# Patient Record
Sex: FEMALE | Race: OTHER | HISPANIC OR LATINO | URBAN - METROPOLITAN AREA
[De-identification: names, ages, dates, MRNs, and addresses within clinical notes are randomized per-mention and may not be internally consistent; named-entity substitution may affect disease eponyms.]

---

## 2020-01-11 ENCOUNTER — INPATIENT (INPATIENT)
Facility: HOSPITAL | Age: 85
LOS: 0 days | Discharge: HOME | DRG: 389 | End: 2020-01-12
Attending: INTERNAL MEDICINE | Admitting: INTERNAL MEDICINE
Payer: COMMERCIAL

## 2020-01-11 VITALS
RESPIRATION RATE: 18 BRPM | SYSTOLIC BLOOD PRESSURE: 196 MMHG | DIASTOLIC BLOOD PRESSURE: 75 MMHG | TEMPERATURE: 97 F | OXYGEN SATURATION: 96 % | HEART RATE: 91 BPM

## 2020-01-11 DIAGNOSIS — K56.609 UNSPECIFIED INTESTINAL OBSTRUCTION, UNSPECIFIED AS TO PARTIAL VERSUS COMPLETE OBSTRUCTION: ICD-10-CM

## 2020-01-11 DIAGNOSIS — Z90.49 ACQUIRED ABSENCE OF OTHER SPECIFIED PARTS OF DIGESTIVE TRACT: Chronic | ICD-10-CM

## 2020-01-11 LAB
ALBUMIN SERPL ELPH-MCNC: 3.7 G/DL — SIGNIFICANT CHANGE UP (ref 3.3–5.2)
ALP SERPL-CCNC: 440 U/L — HIGH (ref 40–120)
ALT FLD-CCNC: 78 U/L — HIGH
ANION GAP SERPL CALC-SCNC: 13 MMOL/L — SIGNIFICANT CHANGE UP (ref 5–17)
APPEARANCE UR: ABNORMAL
APTT BLD: 32.7 SEC — SIGNIFICANT CHANGE UP (ref 27.5–36.3)
AST SERPL-CCNC: 172 U/L — HIGH
BACTERIA # UR AUTO: ABNORMAL
BASOPHILS # BLD AUTO: 0.04 K/UL — SIGNIFICANT CHANGE UP (ref 0–0.2)
BASOPHILS NFR BLD AUTO: 0.3 % — SIGNIFICANT CHANGE UP (ref 0–2)
BILIRUB SERPL-MCNC: 0.2 MG/DL — LOW (ref 0.4–2)
BILIRUB UR-MCNC: NEGATIVE — SIGNIFICANT CHANGE UP
BUN SERPL-MCNC: 22 MG/DL — HIGH (ref 8–20)
CALCIUM SERPL-MCNC: 9.2 MG/DL — SIGNIFICANT CHANGE UP (ref 8.6–10.2)
CHLORIDE SERPL-SCNC: 107 MMOL/L — SIGNIFICANT CHANGE UP (ref 98–107)
CO2 SERPL-SCNC: 18 MMOL/L — LOW (ref 22–29)
COLOR SPEC: YELLOW — SIGNIFICANT CHANGE UP
COMMENT - URINE: SIGNIFICANT CHANGE UP
CREAT SERPL-MCNC: 0.75 MG/DL — SIGNIFICANT CHANGE UP (ref 0.5–1.3)
DIFF PNL FLD: ABNORMAL
EOSINOPHIL # BLD AUTO: 0.24 K/UL — SIGNIFICANT CHANGE UP (ref 0–0.5)
EOSINOPHIL NFR BLD AUTO: 1.8 % — SIGNIFICANT CHANGE UP (ref 0–6)
EPI CELLS # UR: SIGNIFICANT CHANGE UP
GLUCOSE SERPL-MCNC: 159 MG/DL — HIGH (ref 70–115)
GLUCOSE UR QL: NEGATIVE MG/DL — SIGNIFICANT CHANGE UP
HCT VFR BLD CALC: 35.4 % — SIGNIFICANT CHANGE UP (ref 34.5–45)
HGB BLD-MCNC: 10.7 G/DL — LOW (ref 11.5–15.5)
IMM GRANULOCYTES NFR BLD AUTO: 0.7 % — SIGNIFICANT CHANGE UP (ref 0–1.5)
INR BLD: 1.04 RATIO — SIGNIFICANT CHANGE UP (ref 0.88–1.16)
KETONES UR-MCNC: NEGATIVE — SIGNIFICANT CHANGE UP
LACTATE BLDV-MCNC: 1.6 MMOL/L — SIGNIFICANT CHANGE UP (ref 0.5–2)
LEUKOCYTE ESTERASE UR-ACNC: ABNORMAL
LIDOCAIN IGE QN: 101 U/L — HIGH (ref 22–51)
LYMPHOCYTES # BLD AUTO: 1.13 K/UL — SIGNIFICANT CHANGE UP (ref 1–3.3)
LYMPHOCYTES # BLD AUTO: 8.3 % — LOW (ref 13–44)
MCHC RBC-ENTMCNC: 30.1 PG — SIGNIFICANT CHANGE UP (ref 27–34)
MCHC RBC-ENTMCNC: 30.2 GM/DL — LOW (ref 32–36)
MCV RBC AUTO: 99.7 FL — SIGNIFICANT CHANGE UP (ref 80–100)
MONOCYTES # BLD AUTO: 0.7 K/UL — SIGNIFICANT CHANGE UP (ref 0–0.9)
MONOCYTES NFR BLD AUTO: 5.1 % — SIGNIFICANT CHANGE UP (ref 2–14)
NEUTROPHILS # BLD AUTO: 11.46 K/UL — HIGH (ref 1.8–7.4)
NEUTROPHILS NFR BLD AUTO: 83.8 % — HIGH (ref 43–77)
NITRITE UR-MCNC: NEGATIVE — SIGNIFICANT CHANGE UP
PH UR: 8 — SIGNIFICANT CHANGE UP (ref 5–8)
PLATELET # BLD AUTO: 367 K/UL — SIGNIFICANT CHANGE UP (ref 150–400)
POTASSIUM SERPL-MCNC: 4.2 MMOL/L — SIGNIFICANT CHANGE UP (ref 3.5–5.3)
POTASSIUM SERPL-SCNC: 4.2 MMOL/L — SIGNIFICANT CHANGE UP (ref 3.5–5.3)
PROT SERPL-MCNC: 8.4 G/DL — SIGNIFICANT CHANGE UP (ref 6.6–8.7)
PROT UR-MCNC: 100 MG/DL
PROTHROM AB SERPL-ACNC: 12 SEC — SIGNIFICANT CHANGE UP (ref 10–12.9)
RBC # BLD: 3.55 M/UL — LOW (ref 3.8–5.2)
RBC # FLD: 14 % — SIGNIFICANT CHANGE UP (ref 10.3–14.5)
RBC CASTS # UR COMP ASSIST: ABNORMAL /HPF (ref 0–4)
SODIUM SERPL-SCNC: 138 MMOL/L — SIGNIFICANT CHANGE UP (ref 135–145)
SP GR SPEC: 1.01 — SIGNIFICANT CHANGE UP (ref 1.01–1.02)
UROBILINOGEN FLD QL: NEGATIVE MG/DL — SIGNIFICANT CHANGE UP
WBC # BLD: 13.66 K/UL — HIGH (ref 3.8–10.5)
WBC # FLD AUTO: 13.66 K/UL — HIGH (ref 3.8–10.5)
WBC UR QL: ABNORMAL

## 2020-01-11 PROCEDURE — 99285 EMERGENCY DEPT VISIT HI MDM: CPT

## 2020-01-11 PROCEDURE — 99223 1ST HOSP IP/OBS HIGH 75: CPT

## 2020-01-11 PROCEDURE — 99203 OFFICE O/P NEW LOW 30 MIN: CPT

## 2020-01-11 PROCEDURE — 74177 CT ABD & PELVIS W/CONTRAST: CPT | Mod: 26

## 2020-01-11 RX ORDER — TRAMADOL HYDROCHLORIDE 50 MG/1
1 TABLET ORAL
Qty: 0 | Refills: 0 | DISCHARGE

## 2020-01-11 RX ORDER — CARVEDILOL PHOSPHATE 80 MG/1
2 CAPSULE, EXTENDED RELEASE ORAL
Qty: 0 | Refills: 0 | DISCHARGE

## 2020-01-11 RX ORDER — TAMSULOSIN HYDROCHLORIDE 0.4 MG/1
1 CAPSULE ORAL
Qty: 0 | Refills: 0 | DISCHARGE

## 2020-01-11 RX ORDER — LABETALOL HCL 100 MG
10 TABLET ORAL EVERY 4 HOURS
Refills: 0 | Status: DISCONTINUED | OUTPATIENT
Start: 2020-01-11 | End: 2020-01-11

## 2020-01-11 RX ORDER — SODIUM CHLORIDE 9 MG/ML
1000 INJECTION INTRAMUSCULAR; INTRAVENOUS; SUBCUTANEOUS ONCE
Refills: 0 | Status: COMPLETED | OUTPATIENT
Start: 2020-01-11 | End: 2020-01-11

## 2020-01-11 RX ORDER — OMEPRAZOLE 10 MG/1
1 CAPSULE, DELAYED RELEASE ORAL
Qty: 0 | Refills: 0 | DISCHARGE

## 2020-01-11 RX ORDER — FAMOTIDINE 10 MG/ML
20 INJECTION INTRAVENOUS ONCE
Refills: 0 | Status: COMPLETED | OUTPATIENT
Start: 2020-01-11 | End: 2020-01-11

## 2020-01-11 RX ORDER — NIFEDIPINE 30 MG
0.5 TABLET, EXTENDED RELEASE 24 HR ORAL
Qty: 0 | Refills: 0 | DISCHARGE

## 2020-01-11 RX ORDER — CEFTRIAXONE 500 MG/1
1000 INJECTION, POWDER, FOR SOLUTION INTRAMUSCULAR; INTRAVENOUS ONCE
Refills: 0 | Status: COMPLETED | OUTPATIENT
Start: 2020-01-11 | End: 2020-01-11

## 2020-01-11 RX ORDER — NIFEDIPINE 30 MG
15 TABLET, EXTENDED RELEASE 24 HR ORAL ONCE
Refills: 0 | Status: DISCONTINUED | OUTPATIENT
Start: 2020-01-11 | End: 2020-01-11

## 2020-01-11 RX ORDER — LANOLIN ALCOHOL/MO/W.PET/CERES
1 CREAM (GRAM) TOPICAL
Qty: 0 | Refills: 0 | DISCHARGE

## 2020-01-11 RX ORDER — HYDRALAZINE HCL 50 MG
10 TABLET ORAL EVERY 4 HOURS
Refills: 0 | Status: DISCONTINUED | OUTPATIENT
Start: 2020-01-11 | End: 2020-01-11

## 2020-01-11 RX ORDER — MORPHINE SULFATE 50 MG/1
2 CAPSULE, EXTENDED RELEASE ORAL ONCE
Refills: 0 | Status: DISCONTINUED | OUTPATIENT
Start: 2020-01-11 | End: 2020-01-11

## 2020-01-11 RX ORDER — ASPIRIN/CALCIUM CARB/MAGNESIUM 324 MG
1 TABLET ORAL
Qty: 0 | Refills: 0 | DISCHARGE

## 2020-01-11 RX ORDER — HYDRALAZINE HCL 50 MG
10 TABLET ORAL EVERY 4 HOURS
Refills: 0 | Status: DISCONTINUED | OUTPATIENT
Start: 2020-01-11 | End: 2020-01-12

## 2020-01-11 RX ORDER — SODIUM CHLORIDE 9 MG/ML
1000 INJECTION, SOLUTION INTRAVENOUS
Refills: 0 | Status: DISCONTINUED | OUTPATIENT
Start: 2020-01-11 | End: 2020-01-12

## 2020-01-11 RX ORDER — KETOROLAC TROMETHAMINE 30 MG/ML
15 SYRINGE (ML) INJECTION EVERY 6 HOURS
Refills: 0 | Status: DISCONTINUED | OUTPATIENT
Start: 2020-01-11 | End: 2020-01-12

## 2020-01-11 RX ORDER — MIRTAZAPINE 45 MG/1
1 TABLET, ORALLY DISINTEGRATING ORAL
Qty: 0 | Refills: 0 | DISCHARGE

## 2020-01-11 RX ORDER — AMLODIPINE BESYLATE 2.5 MG/1
5 TABLET ORAL ONCE
Refills: 0 | Status: COMPLETED | OUTPATIENT
Start: 2020-01-11 | End: 2020-01-11

## 2020-01-11 RX ORDER — HYDRALAZINE HCL 50 MG
10 TABLET ORAL ONCE
Refills: 0 | Status: COMPLETED | OUTPATIENT
Start: 2020-01-11 | End: 2020-01-11

## 2020-01-11 RX ORDER — CEFTRIAXONE 500 MG/1
1000 INJECTION, POWDER, FOR SOLUTION INTRAMUSCULAR; INTRAVENOUS EVERY 24 HOURS
Refills: 0 | Status: DISCONTINUED | OUTPATIENT
Start: 2020-01-11 | End: 2020-01-12

## 2020-01-11 RX ORDER — HALOPERIDOL DECANOATE 100 MG/ML
1 INJECTION INTRAMUSCULAR EVERY 6 HOURS
Refills: 0 | Status: DISCONTINUED | OUTPATIENT
Start: 2020-01-11 | End: 2020-01-12

## 2020-01-11 RX ORDER — PANTOPRAZOLE SODIUM 20 MG/1
40 TABLET, DELAYED RELEASE ORAL
Refills: 0 | Status: DISCONTINUED | OUTPATIENT
Start: 2020-01-11 | End: 2020-01-12

## 2020-01-11 RX ORDER — CARVEDILOL PHOSPHATE 80 MG/1
1 CAPSULE, EXTENDED RELEASE ORAL
Qty: 0 | Refills: 0 | DISCHARGE

## 2020-01-11 RX ORDER — ONDANSETRON 8 MG/1
4 TABLET, FILM COATED ORAL EVERY 4 HOURS
Refills: 0 | Status: DISCONTINUED | OUTPATIENT
Start: 2020-01-11 | End: 2020-01-12

## 2020-01-11 RX ORDER — ONDANSETRON 8 MG/1
4 TABLET, FILM COATED ORAL ONCE
Refills: 0 | Status: COMPLETED | OUTPATIENT
Start: 2020-01-11 | End: 2020-01-11

## 2020-01-11 RX ORDER — ACETAMINOPHEN 500 MG
650 TABLET ORAL EVERY 6 HOURS
Refills: 0 | Status: DISCONTINUED | OUTPATIENT
Start: 2020-01-11 | End: 2020-01-12

## 2020-01-11 RX ADMIN — ONDANSETRON 4 MILLIGRAM(S): 8 TABLET, FILM COATED ORAL at 10:30

## 2020-01-11 RX ADMIN — Medication 10 MILLIGRAM(S): at 20:19

## 2020-01-11 RX ADMIN — SODIUM CHLORIDE 1000 MILLILITER(S): 9 INJECTION INTRAMUSCULAR; INTRAVENOUS; SUBCUTANEOUS at 11:28

## 2020-01-11 RX ADMIN — CEFTRIAXONE 1000 MILLIGRAM(S): 500 INJECTION, POWDER, FOR SOLUTION INTRAMUSCULAR; INTRAVENOUS at 15:49

## 2020-01-11 RX ADMIN — Medication 15 MILLIGRAM(S): at 18:31

## 2020-01-11 RX ADMIN — CEFTRIAXONE 100 MILLIGRAM(S): 500 INJECTION, POWDER, FOR SOLUTION INTRAMUSCULAR; INTRAVENOUS at 15:19

## 2020-01-11 RX ADMIN — AMLODIPINE BESYLATE 5 MILLIGRAM(S): 2.5 TABLET ORAL at 15:19

## 2020-01-11 RX ADMIN — MORPHINE SULFATE 2 MILLIGRAM(S): 50 CAPSULE, EXTENDED RELEASE ORAL at 11:24

## 2020-01-11 RX ADMIN — PANTOPRAZOLE SODIUM 40 MILLIGRAM(S): 20 TABLET, DELAYED RELEASE ORAL at 16:50

## 2020-01-11 RX ADMIN — SODIUM CHLORIDE 1000 MILLILITER(S): 9 INJECTION INTRAMUSCULAR; INTRAVENOUS; SUBCUTANEOUS at 10:28

## 2020-01-11 RX ADMIN — MORPHINE SULFATE 2 MILLIGRAM(S): 50 CAPSULE, EXTENDED RELEASE ORAL at 16:24

## 2020-01-11 RX ADMIN — MORPHINE SULFATE 2 MILLIGRAM(S): 50 CAPSULE, EXTENDED RELEASE ORAL at 16:34

## 2020-01-11 RX ADMIN — Medication 10 MILLIGRAM(S): at 18:24

## 2020-01-11 RX ADMIN — MORPHINE SULFATE 2 MILLIGRAM(S): 50 CAPSULE, EXTENDED RELEASE ORAL at 10:29

## 2020-01-11 RX ADMIN — FAMOTIDINE 20 MILLIGRAM(S): 10 INJECTION INTRAVENOUS at 10:29

## 2020-01-11 RX ADMIN — SODIUM CHLORIDE 83 MILLILITER(S): 9 INJECTION, SOLUTION INTRAVENOUS at 16:48

## 2020-01-11 RX ADMIN — Medication 15 MILLIGRAM(S): at 18:47

## 2020-01-11 RX ADMIN — Medication 10 MILLIGRAM(S): at 23:39

## 2020-01-11 NOTE — CONSULT NOTE ADULT - ATTENDING COMMENTS
89yo female PMH dementia, HTN, gastritis, colon cancer s/p colectomy, , and multiple events of SBO presenting with  LLQ pain a/w diarrhea (nonbloody) and belching. Patient woke up this morning and developed severe LUQ pain. Took BP meds this morning (nifedipine) but then immediately vomited. No fever, chills, chest pain, and sbo. No black/bloody stools. Patient's son was at bedside and he takes care of her. ED work up had CT scan indicative of SBO. Surgery consulted for eval.     AAOx1, NAD    AVSS    PUL: CTA  CV:RRR  GI: mild distension but soft and NT, Midline incisions are healed    Plan:  1. Doubt SBO due to flatus and BM. I would proceed with gastrograffen swallow and if it passes thru to her collon than she can be d/c to home  2. Treaqt for UTI and HTN      code 61857

## 2020-01-11 NOTE — ED ADULT NURSE NOTE - OBJECTIVE STATEMENT
Assumed care at 1010 pt co LLQ pain that started this morning with N/V. as per son pt woke up asked for something to eat after breakfast started co of severe pain to abdomen. pt has hx of dementia, AOX2. Son states she vomited after taking her BP medications.

## 2020-01-11 NOTE — ED ADULT NURSE REASSESSMENT NOTE - NS ED NURSE REASSESS COMMENT FT1
MD Morales Surgery team at pts bedside, plan of care explained to pt and family both verbalized understanding.
Medicine PA carli made aware of manual BPs of b/l arms. order for hydralazine placed by PA. will administer as ordered. as per PA, pt is NPO for sx. son arrived back at bedside, updated on plan of care and approves. will continue to reassess.
pt ambulated to the bathroom with 2 person assist, pt denies any pain at the moment, MD Sinha aware of pts elevated BP. pt has clonidine patch to left upper back.
pt cleaned and changed by SNA. assisted to bathroom by SNA and son, pt tolerated well. steady gait noted with 2 assist.
spoke to medicine ARJUN Nieto regarding elevated BP. instructed to take manual BP and call medicine PA back.
received report from BARB Iglesias. received pt in room lying comfortably on stretcher, son at bedside. pt pleasantly confused but as per son, this is pt's baseline mental status for this time of night. pt denies pain at this time. pt in no acute distress, denies sob, chest pain. maintaining airway on room air. pt and son aware of plan of care, sx arrived at bedside to update son on xray to be done at 1am tonight, son agrees. son and pt offer no questions or complaints at this time. BP continues to be elevated. attempted to reach MD Trace MD no longer here. attempted to reach Medicine PA, left message. son left bedside to get food for self. pt given call bell and instructed on use, was able to demonstrate use back to RN. fall precautions remain in place. will continue to reassess.

## 2020-01-11 NOTE — ED PROVIDER NOTE - CLINICAL SUMMARY MEDICAL DECISION MAKING FREE TEXT BOX
87yo female with PMH dementia, HTN, gastritis p/w severe LUQ pain, ddx includes gastritis vs colitis vs renal colic, will check labs, CT a/p, UA, give Gi cocktail, morphine, zofran, and reassess. Elvira Mccain DO

## 2020-01-11 NOTE — ED ADULT TRIAGE NOTE - CHIEF COMPLAINT QUOTE
Patient BIBA, worsening left sided abdominal pain, son states patient was vomiting this morning and passing gas

## 2020-01-11 NOTE — ED ADULT NURSE NOTE - NSIMPLEMENTINTERV_GEN_ALL_ED
Implemented All Fall with Harm Risk Interventions:  [place] to call system. Call bell, personal items and telephone within reach. Instruct patient to call for assistance. Room bathroom lighting operational. Non-slip footwear when patient is off stretcher. Physically safe environment: no spills, clutter or unnecessary equipment. Stretcher in lowest position, wheels locked, appropriate side rails in place. Provide visual cue, wrist band, yellow gown, etc. Monitor gait and stability. Monitor for mental status changes and reorient to person, place, and time. Review medications for side effects contributing to fall risk. Reinforce activity limits and safety measures with patient and family. Provide visual clues: red socks.

## 2020-01-11 NOTE — ED PROVIDER NOTE - OBJECTIVE STATEMENT
87yo female PMH dementia, HTN, gastritis, presenting with LUQ and LLQ pain a/w diarrhea (nonbloody) and belching. Patient woke up this morning, was hungry, then started to develop severe LUQ pain. No fevers. Took BP meds this morning (nifedipine) but then immediately vomited. No chest pain. No black/bloody stools.

## 2020-01-11 NOTE — ED PROVIDER NOTE - PHYSICAL EXAMINATION
Gen: uncomfortable appearing, awake, alert  Head: NCAT  HEENT: oral mucosa moist, normal conjunctiva, oropharynx clear without exudate or erythema  Lung: CTAB, no respiratory distress, no wheezing, rales, rhonchi  CV: normal s1/s2, rrr, no murmurs, Normal perfusion  Abd: soft, +TTP LUQ/LLQ, no guarding/rigidity, no CVA tenderness  MSK: No edema, no visible deformities, full range of motion in all 4 extremities  Neuro:  No focal neurologic deficits  Skin: No rash   Psych: normal affect

## 2020-01-11 NOTE — CONSULT NOTE ADULT - SUBJECTIVE AND OBJECTIVE BOX
ACUTE CARE SURGERY CONSULT     HPI: · HPI Objective Statement: 89yo female PMH dementia, HTN, gastritis, colon cancer s/p colectomy, , and multiple events of SBO presenting with  LLQ pain a/w diarrhea (nonbloody) and belching. Patient woke up this morning, was hungry, then started to develop severe LUQ pain. Took BP meds this morning (nifedipine) but then immediately vomited. No fever, chills, chest pain, and sbo. No black/bloody stools. Patient's son was at bedside who takes care of her and stated that she has visited the hospital multiple times in the past with SBO and that it has resolved with and without NGT but all of them improved after contrast was given and serial Xray's. Patient had an EGD 2 years ago and was negative per son. Las colonoscopy was a long time ago before her colon cancer diagnosis and resection and He does not authorize scopes in her after procedure.       ROS: 10-system review is otherwise negative except HPI above.      PAST MEDICAL & SURGICAL HISTORY:  HTN (hypertension)  Dementia    SOCIAL HISTORY:  Denies toxic habits    ALLERGIES: NKA ACE inhibitors (Unknown)      HOME MEDICATIONS:   aspirin 81 mg oral tablet, chewable: 1 tab(s) orally once a day (2020 10:23)  carvedilol 12.5 mg oral tablet: 1 tab(s) orally 2 times a day (2020 10:)  Melatonin 5 mg oral tablet: 1 tab(s) orally once a day (at bedtime) (2020 10:)  mirtazapine 30 mg oral tablet: 1 tab(s) orally once a day (at bedtime) (2020 10:)  NIFEdipine 30 mg oral tablet, extended release: 0.5 tab(s) orally once a day (2020 10:23)  omeprazole 20 mg oral delayed release capsule: 1 cap(s) orally once a day (2020 10:23)  tamsulosin 0.4 mg oral capsule: 1 cap(s) orally once a day (2020 10:23)  traMADol 50 mg oral tablet: 1 tab(s) orally every 4 hours, As Needed (2020 10:23)      --------------------------------------------------------------------------------------------    PHYSICAL EXAM:   General: NAD, Lying in bed comfortably  Neuro: A+Ox3  HEENT: NC/AT, EOMI, PERRLA, MMM  Cardio: RRR   Resp: Non labored breathing   GI/Abd: Soft, NT/ND, no rebound/guarding, no masses palpated  Vascular: All 4 extremities warm.  Musculoskeletal: All 4 extremities moving spontaneously, no limitations, no spinal tenderness or stepoffs  --------------------------------------------------------------------------------------------    LABS                 10.7   13.66  )----------(  367       ( 2020 10:13 )               35.4      138    |  107    |  22.0   ----------------------------<  159        ( 2020 10:13 )  4.2     |  18.0   |  0.75     Ca    9.2        ( 2020 10:13 )    TPro  8.4    /  Alb  3.7    /  TBili  0.2    /  DBili  x      /  AST  172    /  ALT  78     /  AlkPhos  440    ( 2020 10:13 )    LIVER FUNCTIONS - ( 2020 10:13 )  Alb: 3.7 g/dL / Pro: 8.4 g/dL / ALK PHOS: 440 U/L / ALT: 78 U/L / AST: 172 U/L / GGT: x           PT/INR -  12.0 sec / 1.04 ratio   ( 2020 10:13 )       PTT -  32.7 sec   ( 2020 10:13 )  CAPILLARY BLOOD GLUCOSE    CARDIAC MARKERS ( 2020 10:13 )  x     / <0.01 ng/mL / x     / x     / x          Urinalysis Basic - ( 2020 11:46 )    Color: Yellow / Appearance: Slightly Turbid / S.010 / pH: x  Gluc: x / Ketone: Negative  / Bili: Negative / Urobili: Negative mg/dL   Blood: x / Protein: 100 mg/dL / Nitrite: Negative   Leuk Esterase: Moderate / RBC: 3-5 /HPF / WBC 6-10   Sq Epi: x / Non Sq Epi: Occasional / Bacteria: Few        10:14 - VBG - pH:       | pCO2:       | pO2:       | Lactate: 1.6      --------------------------------------------------------------------------------------------  IMAGING    EXAM: CT ABDOMEN AND PELVIS IC     PROCEDURE DATE: 2020         INTERPRETATION: CT ABDOMEN AND PELVIS WITH IV CONTRAST     CLINICAL HISTORY:LUQ/LLQ tenderness, vomiting .     TECHNIQUE: Contrast enhanced axial images through the abdomen and pelvis   were obtained using 90cc Optiray 320 contrast IV. 10 ml of contrast was   discarded. Sagittal/coronal reconstructions were performed. This study was   performed using automatic exposure control that minimizes radiation exposure   to obtain a quality diagnostic exam with patient dose as low as reasonably   achievable.     COMPARISON: None.     ABDOMEN /PELVIC FINDINGS:     LOWERMOST THORAX INCLUDED ON THIS EXAM: There is atelectasis in the   posterior left lung base. Bibasilar parenchymal scarring is noted. .     LIVER: Unremarkable. .     GALLBLADDER/BILE DUCTS: Unremarkable.. No biliary duct dilation.     PANCREAS: Unremarkable.     SPLEEN: Unremarkable.     ADRENALS: Unremarkable.   .   KIDNEYS/URINARY BLADDER: Unremarkable.. No hydronephrosis or renal stones. .   No urinary bladder abnormality. 1 cm cyst is seen in the upper pole the   right kidney.     BOWEL: Unremarkable stomach/duodenum.. There is moderate diffuse small bowel   distention with transition zone region of the distal/terminal ileum.   Findings consistent with distal small bowel obstruction. The colon is not   distended     MESENTERY/RETROPERITONEUM: No suspicious lymph nodes.     PERITONEUM: No ascites. .No free air, no loculated abdominal/pelvic fluid   collections.     VESSELS:Aortic vascular calcification without dilatation.     ABDOMINAL WALL: Unremarkable.     PELVIS:     REPRODUCTIVE ORGANS/PELVIC SIDE WALLS: Unremarkable.     BONES: No concerning osseous lesions.     IMPRESSION:     1. Findings consistent with distal small bowel obstruction in the terminal   ileum     2. Findings given to Dr. Andrea at 11:50 AM on 2020

## 2020-01-11 NOTE — CONSULT NOTE ADULT - ASSESSMENT
ASSESSMENT: Patient is a 88y old female s/p colectomy for colon cancer and  x2 with PMH of recurrent SBO that resolve w medical management currently in the ED with and SBO.     PLAN:    - Pending attending evaluation  - NPO/IVF  - pain control  - DVT ppx  - OOB/ambulate  - strict I/Os ASSESSMENT: Patient is a 88y old female s/p colectomy for colon cancer and  x2 with PMH of recurrent SBO that resolve w medical management currently in the ED with and SBO. Patient currently feeling better, passing flatus, and having BM.     PLAN:    - Recommend medicine for HTN and UTI  - Gastrografin challenge.   - NPO/IVF  - Zofran  - pain control  - DVT ppx  - OOB/ambulate  - strict I/Os

## 2020-01-11 NOTE — H&P ADULT - HISTORY OF PRESENT ILLNESS
87yo F w/ hx dementia, HTN, gastritis, colon cancer s/p colectomy, , and multiple events of SBO presents to ER for acute onset nausea/vomiting and diarrhea. Unable to tolerate food or home meds. no chest pain/sob/palps. episode similar to prior SBO.  Son at bedside states her SBO usually resolved after gastrografin administration and/or NGT and at this time declines NG tube or surgery.   son also states patient usually "sundowns" during hospitalizations.

## 2020-01-12 VITALS
SYSTOLIC BLOOD PRESSURE: 124 MMHG | OXYGEN SATURATION: 96 % | DIASTOLIC BLOOD PRESSURE: 82 MMHG | TEMPERATURE: 98 F | HEART RATE: 88 BPM | RESPIRATION RATE: 18 BRPM

## 2020-01-12 LAB
ALBUMIN SERPL ELPH-MCNC: 2.9 G/DL — LOW (ref 3.3–5.2)
ALP SERPL-CCNC: 312 U/L — HIGH (ref 40–120)
ALT FLD-CCNC: 50 U/L — HIGH
ANION GAP SERPL CALC-SCNC: 12 MMOL/L — SIGNIFICANT CHANGE UP (ref 5–17)
AST SERPL-CCNC: 79 U/L — HIGH
BASOPHILS # BLD AUTO: 0.01 K/UL — SIGNIFICANT CHANGE UP (ref 0–0.2)
BASOPHILS NFR BLD AUTO: 0.1 % — SIGNIFICANT CHANGE UP (ref 0–2)
BILIRUB SERPL-MCNC: <0.2 MG/DL — LOW (ref 0.4–2)
BUN SERPL-MCNC: 17 MG/DL — SIGNIFICANT CHANGE UP (ref 8–20)
CALCIUM SERPL-MCNC: 8 MG/DL — LOW (ref 8.6–10.2)
CHLORIDE SERPL-SCNC: 113 MMOL/L — HIGH (ref 98–107)
CO2 SERPL-SCNC: 16 MMOL/L — LOW (ref 22–29)
CREAT SERPL-MCNC: 0.81 MG/DL — SIGNIFICANT CHANGE UP (ref 0.5–1.3)
CULTURE RESULTS: SIGNIFICANT CHANGE UP
EOSINOPHIL # BLD AUTO: 0.14 K/UL — SIGNIFICANT CHANGE UP (ref 0–0.5)
EOSINOPHIL NFR BLD AUTO: 1.9 % — SIGNIFICANT CHANGE UP (ref 0–6)
GLUCOSE SERPL-MCNC: 112 MG/DL — SIGNIFICANT CHANGE UP (ref 70–115)
HCT VFR BLD CALC: 27.1 % — LOW (ref 34.5–45)
HGB BLD-MCNC: 8.4 G/DL — LOW (ref 11.5–15.5)
IMM GRANULOCYTES NFR BLD AUTO: 0.4 % — SIGNIFICANT CHANGE UP (ref 0–1.5)
LYMPHOCYTES # BLD AUTO: 1.25 K/UL — SIGNIFICANT CHANGE UP (ref 1–3.3)
LYMPHOCYTES # BLD AUTO: 16.9 % — SIGNIFICANT CHANGE UP (ref 13–44)
MCHC RBC-ENTMCNC: 30.7 PG — SIGNIFICANT CHANGE UP (ref 27–34)
MCHC RBC-ENTMCNC: 31 GM/DL — LOW (ref 32–36)
MCV RBC AUTO: 98.9 FL — SIGNIFICANT CHANGE UP (ref 80–100)
MONOCYTES # BLD AUTO: 0.68 K/UL — SIGNIFICANT CHANGE UP (ref 0–0.9)
MONOCYTES NFR BLD AUTO: 9.2 % — SIGNIFICANT CHANGE UP (ref 2–14)
NEUTROPHILS # BLD AUTO: 5.27 K/UL — SIGNIFICANT CHANGE UP (ref 1.8–7.4)
NEUTROPHILS NFR BLD AUTO: 71.5 % — SIGNIFICANT CHANGE UP (ref 43–77)
PLATELET # BLD AUTO: 226 K/UL — SIGNIFICANT CHANGE UP (ref 150–400)
POTASSIUM SERPL-MCNC: 3.6 MMOL/L — SIGNIFICANT CHANGE UP (ref 3.5–5.3)
POTASSIUM SERPL-SCNC: 3.6 MMOL/L — SIGNIFICANT CHANGE UP (ref 3.5–5.3)
PROT SERPL-MCNC: 6.4 G/DL — LOW (ref 6.6–8.7)
RBC # BLD: 2.74 M/UL — LOW (ref 3.8–5.2)
RBC # FLD: 14.1 % — SIGNIFICANT CHANGE UP (ref 10.3–14.5)
SODIUM SERPL-SCNC: 141 MMOL/L — SIGNIFICANT CHANGE UP (ref 135–145)
SPECIMEN SOURCE: SIGNIFICANT CHANGE UP
WBC # BLD: 7.38 K/UL — SIGNIFICANT CHANGE UP (ref 3.8–10.5)
WBC # FLD AUTO: 7.38 K/UL — SIGNIFICANT CHANGE UP (ref 3.8–10.5)

## 2020-01-12 PROCEDURE — 85730 THROMBOPLASTIN TIME PARTIAL: CPT

## 2020-01-12 PROCEDURE — 74177 CT ABD & PELVIS W/CONTRAST: CPT

## 2020-01-12 PROCEDURE — 96374 THER/PROPH/DIAG INJ IV PUSH: CPT

## 2020-01-12 PROCEDURE — 36415 COLL VENOUS BLD VENIPUNCTURE: CPT

## 2020-01-12 PROCEDURE — 84484 ASSAY OF TROPONIN QUANT: CPT

## 2020-01-12 PROCEDURE — 99285 EMERGENCY DEPT VISIT HI MDM: CPT | Mod: 25

## 2020-01-12 PROCEDURE — 83690 ASSAY OF LIPASE: CPT

## 2020-01-12 PROCEDURE — 74018 RADEX ABDOMEN 1 VIEW: CPT

## 2020-01-12 PROCEDURE — 74018 RADEX ABDOMEN 1 VIEW: CPT | Mod: 26

## 2020-01-12 PROCEDURE — 87086 URINE CULTURE/COLONY COUNT: CPT

## 2020-01-12 PROCEDURE — 80053 COMPREHEN METABOLIC PANEL: CPT

## 2020-01-12 PROCEDURE — 96361 HYDRATE IV INFUSION ADD-ON: CPT

## 2020-01-12 PROCEDURE — 99239 HOSP IP/OBS DSCHRG MGMT >30: CPT

## 2020-01-12 PROCEDURE — 96375 TX/PRO/DX INJ NEW DRUG ADDON: CPT

## 2020-01-12 PROCEDURE — 85610 PROTHROMBIN TIME: CPT

## 2020-01-12 PROCEDURE — 81001 URINALYSIS AUTO W/SCOPE: CPT

## 2020-01-12 PROCEDURE — 83605 ASSAY OF LACTIC ACID: CPT

## 2020-01-12 PROCEDURE — 85027 COMPLETE CBC AUTOMATED: CPT

## 2020-01-12 RX ORDER — NIFEDIPINE 30 MG
30 TABLET, EXTENDED RELEASE 24 HR ORAL DAILY
Refills: 0 | Status: DISCONTINUED | OUTPATIENT
Start: 2020-01-12 | End: 2020-01-12

## 2020-01-12 RX ORDER — TAMSULOSIN HYDROCHLORIDE 0.4 MG/1
0.4 CAPSULE ORAL AT BEDTIME
Refills: 0 | Status: DISCONTINUED | OUTPATIENT
Start: 2020-01-12 | End: 2020-01-12

## 2020-01-12 RX ORDER — NIFEDIPINE 30 MG
15 TABLET, EXTENDED RELEASE 24 HR ORAL EVERY 8 HOURS
Refills: 0 | Status: DISCONTINUED | OUTPATIENT
Start: 2020-01-12 | End: 2020-01-12

## 2020-01-12 RX ORDER — CEFDINIR 250 MG/5ML
1 POWDER, FOR SUSPENSION ORAL
Qty: 10 | Refills: 0
Start: 2020-01-12 | End: 2020-01-16

## 2020-01-12 RX ORDER — ACETAMINOPHEN 500 MG
2 TABLET ORAL
Qty: 0 | Refills: 0 | DISCHARGE
Start: 2020-01-12

## 2020-01-12 RX ORDER — MIRTAZAPINE 45 MG/1
30 TABLET, ORALLY DISINTEGRATING ORAL AT BEDTIME
Refills: 0 | Status: DISCONTINUED | OUTPATIENT
Start: 2020-01-12 | End: 2020-01-12

## 2020-01-12 RX ORDER — CARVEDILOL PHOSPHATE 80 MG/1
25 CAPSULE, EXTENDED RELEASE ORAL EVERY 12 HOURS
Refills: 0 | Status: DISCONTINUED | OUTPATIENT
Start: 2020-01-12 | End: 2020-01-12

## 2020-01-12 RX ADMIN — Medication 15 MILLIGRAM(S): at 09:40

## 2020-01-12 RX ADMIN — Medication 10 MILLIGRAM(S): at 10:15

## 2020-01-12 RX ADMIN — Medication 15 MILLIGRAM(S): at 00:40

## 2020-01-12 RX ADMIN — PANTOPRAZOLE SODIUM 40 MILLIGRAM(S): 20 TABLET, DELAYED RELEASE ORAL at 05:37

## 2020-01-12 RX ADMIN — Medication 15 MILLIGRAM(S): at 14:38

## 2020-01-12 RX ADMIN — Medication 15 MILLIGRAM(S): at 01:40

## 2020-01-12 RX ADMIN — Medication 10 MILLIGRAM(S): at 05:44

## 2020-01-12 RX ADMIN — Medication 15 MILLIGRAM(S): at 14:06

## 2020-01-12 RX ADMIN — CEFTRIAXONE 100 MILLIGRAM(S): 500 INJECTION, POWDER, FOR SOLUTION INTRAMUSCULAR; INTRAVENOUS at 14:09

## 2020-01-12 RX ADMIN — Medication 15 MILLIGRAM(S): at 07:35

## 2020-01-12 RX ADMIN — SODIUM CHLORIDE 83 MILLILITER(S): 9 INJECTION, SOLUTION INTRAVENOUS at 07:35

## 2020-01-12 RX ADMIN — CARVEDILOL PHOSPHATE 25 MILLIGRAM(S): 80 CAPSULE, EXTENDED RELEASE ORAL at 14:09

## 2020-01-12 NOTE — PROGRESS NOTE ADULT - ASSESSMENT
Patient is a 88y old female s/p colectomy for colon cancer and  x2 with PMH of recurrent SBO that resolve w medical management currently in the ED with and SBO. Patient currently feeling better, passing flatus, and having BM.     PLAN:    - Management per primary team  - Repeat KUB later this AM to assess progression of contrast  - Awaiting full bowel function  - NPO/IVF at this time, diet pending tolerance to gastrografin challenge  - Bowel regimen Patient is a 88y old female s/p colectomy for colon cancer and  x2 with PMH of recurrent SBO that resolve w medical management currently in the ED with and SBO. Patient currently feeling better, passing flatus, and having BM. Contrast observed in descending colon     PLAN:    - Management per primary team  - No surgical management at this time  - Repeat KUB later this AM to assess progression of contrast if primary team decides it's necessary   - Bowel regimen  - Surgery will sign off

## 2020-01-12 NOTE — PROGRESS NOTE ADULT - SUBJECTIVE AND OBJECTIVE BOX
INTERVAL HPI/OVERNIGHT EVENTS:    Patient given gastrografin overnight which was shown to progress into the colon on KUB. She is currently being admitted to the medical service for control of her blood pressure and ongoing UTI. There are no acute issues at this time. Seeing as contrast progresses through the intestinal tract, patient with likely a partial obstruction and requires no surgical intervention at this time. No nausea or emesis this morning. Patient has been hemodynamically stable and denies fevers, chills, chest pain, shortness of breath.    MEDICATIONS  (STANDING):  cefTRIAXone   IVPB 1000 milliGRAM(s) IV Intermittent every 24 hours  dextrose 5% + sodium chloride 0.45%. 1000 milliLiter(s) (83 mL/Hr) IV Continuous <Continuous>  pantoprazole  Injectable 40 milliGRAM(s) IV Push two times a day    MEDICATIONS  (PRN):  acetaminophen   Tablet .. 650 milliGRAM(s) Oral every 6 hours PRN Temp greater or equal to 38C (100.4F), Mild Pain (1 - 3)  haloperidol    Injectable 1 milliGRAM(s) IntraMuscular every 6 hours PRN Agitation  hydrALAZINE Injectable 10 milliGRAM(s) IV Push every 4 hours PRN for BP greater than 170  ketorolac   Injectable 15 milliGRAM(s) IV Push every 6 hours PRN Severe Pain (7 - 10)  ondansetron Injectable 4 milliGRAM(s) IV Push every 4 hours PRN Nausea and/or Vomiting      Vital Signs Last 24 Hrs  T(C): 37.3 (2020 00:34), Max: 37.3 (2020 00:34)  T(F): 99.1 (2020 00:34), Max: 99.1 (2020 00:34)  HR: 80 (2020 00:34) (80 - 101)  BP: 177/62 (2020 00:34) (170/80 - 211/97)  BP(mean): --  RR: 17 (2020 00:34) (17 - 18)  SpO2: 98% (2020 00:34) (95% - 98%)      PHYSICAL EXAM:   General: NAD, Lying in bed comfortably  Neuro: A+Ox3  HEENT: NC/AT, EOMI, PERRLA, MMM  Cardio: RRR   Resp: Non labored breathing   GI/Abd: Soft, NT/ND, no rebound/guarding, no masses palpated  Vascular: All 4 extremities warm.  Musculoskeletal: All 4 extremities moving spontaneously, no limitations, no spinal tenderness or stepoffs      I&O's Detail      LABS:                        10.7   13.66 )-----------( 367      ( 2020 10:13 )             35.4         138  |  107  |  22.0<H>  ----------------------------<  159<H>  4.2   |  18.0<L>  |  0.75    Ca    9.2      2020 10:13    TPro  8.4  /  Alb  3.7  /  TBili  0.2<L>  /  DBili  x   /  AST  172<H>  /  ALT  78<H>  /  AlkPhos  440<H>      PT/INR - ( 2020 10:13 )   PT: 12.0 sec;   INR: 1.04 ratio         PTT - ( 2020 10:13 )  PTT:32.7 sec  Urinalysis Basic - ( 2020 11:46 )    Color: Yellow / Appearance: Slightly Turbid / S.010 / pH: x  Gluc: x / Ketone: Negative  / Bili: Negative / Urobili: Negative mg/dL   Blood: x / Protein: 100 mg/dL / Nitrite: Negative   Leuk Esterase: Moderate / RBC: 3-5 /HPF / WBC 6-10   Sq Epi: x / Non Sq Epi: Occasional / Bacteria: Few        RADIOLOGY & ADDITIONAL STUDIES:

## 2020-01-12 NOTE — DISCHARGE NOTE PROVIDER - CARE PROVIDER_API CALL
Kev Gottlieb)  Gastroenterology; Internal Medicine  [address] [place] [address]  Phone: [phone]  Fax: [phone]  Follow Up Time:

## 2020-01-12 NOTE — DISCHARGE NOTE PROVIDER - NSDCCPCAREPLAN_GEN_ALL_CORE_FT
PRINCIPAL DISCHARGE DIAGNOSIS  Diagnosis: Partial small bowel obstruction  Assessment and Plan of Treatment: resolved  avoid narcotics  follow up with primary care doctor in 2-3 days and Gastroenterology      SECONDARY DISCHARGE DIAGNOSES  Diagnosis: UTI (urinary tract infection)  Assessment and Plan of Treatment: finish course of antibiotics.

## 2020-01-12 NOTE — DISCHARGE NOTE PROVIDER - HOSPITAL COURSE
87yo F w/ hx dementia, HTN, gastritis, colon cancer s/p colectomy, , and multiple events of SBO presents to ER for acute onset nausea/vomiting and diarrhea.    in ER found to have partial small bowel obstruction in terminal ileum.    seen by surgery, underwent gastrografin study which showed passage of contrast thru colon.  surgery signed off.    bp improved on home meds. tolerated diet. + BM/flatus. denies abd pain or nausea/vomiting.        dc home in stable condition.    dc planning 34 minutes.    d/w son at bedside who is agreeable.

## 2020-01-12 NOTE — DISCHARGE NOTE NURSING/CASE MANAGEMENT/SOCIAL WORK - PATIENT PORTAL LINK FT
You can access the FollowMyHealth Patient Portal offered by [facility] by registering at the following website: [email or web address]. By joining [facility]’s FollowMyHealth portal, you will also be able to view your health information using other applications (apps) compatible with our system.

## 2020-01-12 NOTE — DISCHARGE NOTE PROVIDER - NSDCMRMEDTOKEN_GEN_ALL_CORE_FT
acetaminophen 325 mg oral tablet: 2 tab(s) orally every 6 hours, As needed, Temp greater or equal to 38C (100.4F), Mild Pain (1 - 3)  aspirin 81 mg oral tablet, chewable: 1 tab(s) orally once a day  carvedilol 12.5 mg oral tablet: 2 tab(s) orally 2 times a day  cefdinir 300 mg oral capsule: 1 cap(s) orally 2 times a day   cloNIDine topical:   mirtazapine 30 mg oral tablet: 1 tab(s) orally once a day (at bedtime)  NIFEdipine 30 mg oral tablet, extended release: 0.5 tab(s) orally 3 times a day  omeprazole 20 mg oral delayed release capsule: 1 cap(s) orally once a day  tamsulosin 0.4 mg oral capsule: 1 cap(s) orally once a day  traMADol 50 mg oral tablet: 1 tab(s) orally every 4 hours, As Needed

## 2021-06-09 NOTE — H&P ADULT - ASSESSMENT
Subjective   Patient reports cough x2 days, and post cough emesis and diarrhea that started today.  She is unable to keep any medications down due to vomiting.  Her son and another family member had similar symptoms on Friday and Saturday.      History provided by:  Patient      Review of Systems   Constitutional: Positive for fever (low grade, 99.5). Negative for chills.   HENT: Negative for congestion, ear pain and sore throat.    Eyes: Negative for pain.   Respiratory: Positive for cough. Negative for chest tightness and shortness of breath.    Cardiovascular: Negative for chest pain.   Gastrointestinal: Positive for diarrhea, nausea and vomiting. Negative for abdominal pain.   Genitourinary: Negative for flank pain and hematuria.   Musculoskeletal: Negative for joint swelling.   Skin: Negative for pallor.   Neurological: Negative for seizures and headaches.   All other systems reviewed and are negative.      Past Medical History:   Diagnosis Date   • Depression        Allergies   Allergen Reactions   • Ceftriaxone Itching       History reviewed. No pertinent surgical history.    History reviewed. No pertinent family history.    Social History     Socioeconomic History   • Marital status:      Spouse name: Not on file   • Number of children: Not on file   • Years of education: Not on file   • Highest education level: Not on file   Tobacco Use   • Smoking status: Never Smoker   • Smokeless tobacco: Never Used   Substance and Sexual Activity   • Alcohol use: Yes     Comment: OCCASIONAL    • Drug use: Never   • Sexual activity: Defer           Objective   Physical Exam  Vitals and nursing note reviewed.   Constitutional:       General: She is not in acute distress.     Appearance: Normal appearance. She is not toxic-appearing.   HENT:      Head: Normocephalic and atraumatic.      Mouth/Throat:      Mouth: Mucous membranes are moist.   Eyes:      Extraocular Movements: Extraocular movements intact.       Pupils: Pupils are equal, round, and reactive to light.   Cardiovascular:      Rate and Rhythm: Normal rate and regular rhythm.      Pulses: Normal pulses.      Heart sounds: Normal heart sounds.   Pulmonary:      Effort: Pulmonary effort is normal. No respiratory distress.      Breath sounds: Normal breath sounds.   Abdominal:      General: Abdomen is flat.      Palpations: Abdomen is soft.      Tenderness: There is no abdominal tenderness.   Musculoskeletal:         General: Normal range of motion.      Cervical back: Normal range of motion and neck supple.   Skin:     General: Skin is warm and dry.      Capillary Refill: Capillary refill takes less than 2 seconds.   Neurological:      Mental Status: She is alert and oriented to person, place, and time. Mental status is at baseline.         Procedures           ED Course  ED Course as of Jun 09 0340   Wed Jun 09, 2021   0336 Pt reports she feels better after IVF and medications. Able to tolerate po fluids without incident. She is ready to be discharged.     [CM]      ED Course User Index  [CM] Alfredo Funk, ROSARIO                                           MDM  Number of Diagnoses or Management Options     Amount and/or Complexity of Data Reviewed  Clinical lab tests: reviewed and ordered    Risk of Complications, Morbidity, and/or Mortality  Presenting problems: low  Diagnostic procedures: minimal  Management options: minimal    Patient Progress  Patient progress: improved      Final diagnoses:   Bilious vomiting with nausea   Gastroenteritis       ED Disposition  ED Disposition     ED Disposition Condition Comment    Discharge Stable           Jenny Hill MD  [address] [place] [id number] [phone]    In 2 days  As needed         Medication List      New Prescriptions    ondansetron ODT 4 MG disintegrating tablet  Commonly known as: ZOFRAN-ODT  Place 1 tablet on the tongue Every 4 (Four) Hours As Needed for Nausea or Vomiting.            Where to Get Your Medications      These medications were sent to [id number] - [place], [place] - [address] AT [facility] [place] OF [address] [facility] & [place] - [phone]  - [phone] FX  [address] [place] [address] [place] [address]    Phone: [phone]   · ondansetron ODT 4 MG disintegrating tablet          Alfredo Funk, APRN  06/09/21 0349     87yo F w/ hx dementia, HTN, gastritis, colon cancer s/p colectomy, , and multiple events of SBO presents to ER for acute onset nausea/vomiting and diarrhea.  in ER found to have partial small bowel obstruction in terminal ileum.    partial SBO    NPO, IVF    avoid narcotics,  prn tylenol/toradol    surgery following     son declines NG Tube at this time but agreeable to NGT if nausea/vomiting or abdominal distension worsen.      no surgical intervention     HTN: hold nifedipine 15mg TID, coreg 25mg BID as NPO    PRN labetalol IV or add hydralazine IV if needed    UTI: f/u culture    ceftriaxone     hx Gastritis: PPI    dementia: supportive care. monitor for delirium    FULL CODE   discussed with son at bedside. all questions answered

## 2022-03-17 NOTE — PATIENT PROFILE ADULT - NSPROPOAURINARYCATHETER_GEN_A_NUR
MD ATTESTATION NOTE    The REI and I have discussed this patient's history, physical exam, and treatment plan.  I have reviewed the documentation and personally had a face to face interaction with the patient. I affirm the documentation and agree with the treatment and plan.  The attached note describes my personal findings.      I provided a substantive portion of the care of the patient.  I personally performed the physical exam in its entirety, and below are my findings.  For this patient encounter, the patient wore surgical mask, I wore full protective PPE including N95 and eye protection.      Brief HPI: Patient is an 82-year-old female who complains of left foot pain.  Patient states she was sitting in a chair with her feet on ottoman.  When she stood up she realized her left foot was asleep and she twisted her left foot.  She complains of left foot and ankle pain.  She denies knee pain, back pain and she did not fall.    PHYSICAL EXAM  ED Triage Vitals [03/16/22 1833]   Temp Heart Rate Resp BP SpO2   98.8 °F (37.1 °C) 64 16 133/93 96 %      Temp src Heart Rate Source Patient Position BP Location FiO2 (%)   Tympanic -- Sitting Right arm --         GENERAL: no acute distress  HENT: nares patent  EYES: no scleral icterus  CV: regular rhythm, normal rate  RESPIRATORY: normal effort  ABDOMEN: Deferred.  MUSCULOSKELETAL: no deformity.  Patient's right foot and ankle is nontender to palpation.  Patient's left knee and fibular head is nontender to palpation.  Her left foot is in a walking boot.  Lateral ankle is mildly tender to palpation.  The lateral aspect of her left foot is tender to palpation at the base of the fifth metatarsal.  NEURO: alert, moves all extremities, follows commands  PSYCH:  calm, cooperative  SKIN: warm, dry    Vital signs and nursing notes reviewed.        Plan: I agree with plans of x-rays of the left foot and ankle.           Brant Morris MD  03/16/22 7545     no

## 2022-08-09 NOTE — PATIENT PROFILE ADULT - PSYCHOSOCIAL CONCERNS
Problem: Risk for Injury, Hyperbaric Oxygen Therapy (HBO Department)  Goal: Remains free from injury (e.g. oxygen toxicity, fire risk)  Outcome: Outcome Met, Continue evaluating goal progress toward completion  Note: Pt tolerated compression. Encouraged repositioning throughout. HBO treatment tolerated.        none